# Patient Record
Sex: FEMALE | Race: WHITE | URBAN - METROPOLITAN AREA
[De-identification: names, ages, dates, MRNs, and addresses within clinical notes are randomized per-mention and may not be internally consistent; named-entity substitution may affect disease eponyms.]

---

## 2023-06-05 ENCOUNTER — OFFICE VISIT (OUTPATIENT)
Facility: LOCATION | Age: 68
End: 2023-06-05
Payer: COMMERCIAL

## 2023-06-05 DIAGNOSIS — E11.9 TYPE 2 DIABETES MELLITUS WITHOUT COMPLICATIONS: ICD-10-CM

## 2023-06-05 PROCEDURE — 99204 OFFICE O/P NEW MOD 45 MIN: CPT | Performed by: OPHTHALMOLOGY

## 2023-06-05 ASSESSMENT — INTRAOCULAR PRESSURE
OS: 25
OD: 22

## 2023-06-05 ASSESSMENT — VISUAL ACUITY
OD: 20/80
OS: 20/50

## 2023-06-05 ASSESSMENT — KERATOMETRY
OD: 43.75
OS: 44.44

## 2023-06-05 NOTE — IMPRESSION/PLAN
Impression: Type 2 diabetes mellitus without complications: Q91.9. Plan: Advised patient to continue care with PCP . Emphasized the importance of keeping BSL and A1C level low.

## 2023-06-05 NOTE — IMPRESSION/PLAN
Impression: Anatomical narrow angle, bilateral: H40.033. Plan: Referred patient to Dr. Drew Pate for further evaluation .

## 2023-06-05 NOTE — IMPRESSION/PLAN
Impression: Age-related nuclear cataract, bilateral: H25.13. Examination today revealed 2+AC , 2+NS OU. Plan: Referred patient to Dr. Twan Altamirano for further evaluation .

## 2023-06-09 ENCOUNTER — OFFICE VISIT (OUTPATIENT)
Facility: LOCATION | Age: 68
End: 2023-06-09
Payer: COMMERCIAL

## 2023-06-09 DIAGNOSIS — H40.033 ANATOMICAL NARROW ANGLE, BILATERAL: ICD-10-CM

## 2023-06-09 DIAGNOSIS — H40.2234 CHRONIC ANGLE CLOSURE, BILATERAL, INDETERMINATE STAGE: Primary | ICD-10-CM

## 2023-06-09 DIAGNOSIS — H25.13 AGE-RELATED NUCLEAR CATARACT, BILATERAL: ICD-10-CM

## 2023-06-09 PROCEDURE — 92133 CPTRZD OPH DX IMG PST SGM ON: CPT | Performed by: STUDENT IN AN ORGANIZED HEALTH CARE EDUCATION/TRAINING PROGRAM

## 2023-06-09 PROCEDURE — 92134 CPTRZ OPH DX IMG PST SGM RTA: CPT | Performed by: STUDENT IN AN ORGANIZED HEALTH CARE EDUCATION/TRAINING PROGRAM

## 2023-06-09 PROCEDURE — 92025 CPTRIZED CORNEAL TOPOGRAPHY: CPT | Performed by: STUDENT IN AN ORGANIZED HEALTH CARE EDUCATION/TRAINING PROGRAM

## 2023-06-09 PROCEDURE — 99214 OFFICE O/P EST MOD 30 MIN: CPT | Performed by: STUDENT IN AN ORGANIZED HEALTH CARE EDUCATION/TRAINING PROGRAM

## 2023-06-09 PROCEDURE — 92020 GONIOSCOPY: CPT | Performed by: STUDENT IN AN ORGANIZED HEALTH CARE EDUCATION/TRAINING PROGRAM

## 2023-06-09 RX ORDER — DORZOLAMIDE HYDROCHLORIDE AND TIMOLOL MALEATE 20; 5 MG/ML; MG/ML
SOLUTION/ DROPS OPHTHALMIC
Qty: 5 | Refills: 11 | Status: ACTIVE
Start: 2023-06-09

## 2023-06-09 ASSESSMENT — INTRAOCULAR PRESSURE
OS: 24
OD: 24
OS: 20
OD: 20

## 2023-06-09 ASSESSMENT — VISUAL ACUITY
OD: 20/80
OS: 20/50

## 2023-06-09 NOTE — IMPRESSION/PLAN
Impression: Chronic angle closure, bilateral, indeterminate stage: J08.7144. CC: Patient presents today for cataract evaluation and glaucoma evaluation. Referred by Dr. Kenna Meier Patient states she started noticing her vision getting worse a few years ago. Chronic angle closure indeterminate stage - difficult to view nerve w/o dilation POHx: FOHx: PMHx:
SocialHx:
Eye medications: Allergies: NKDA Tmax: unknown / pending Target IOP: pending Plan: Testing:
OCT/ONH 06/2023: Thinning OU, baseline OU Gonio 06/2023: Narrow PTM inf OU Today: 	 IOP unacceptable OU Educated patient on the diagnosis and physiological development of glaucoma and angle closure in great detail. Informed patient high eye pressure can cause damage to the optic nerves therefore leading to irreversible vision loss. Educated the patient that glaucoma is a chronic disease and there is no cure and damage done is irreversible but it can be treated and controlled. Discussed risks of acute angle closure glaucoma if we dilate. Informed patient based off today exam we suspect some damage and given the narrow angle we recommend a laser treatment to open a hole to get the pressure down. Advised patient sometimes if eye pressure does not get low enough we would recommend cataract surgery but before proceeding with that we need to ensure IOP goes down to safely dilate her eyes. Informed patient we are unable to determine how much vision would improve after treatment but for now we focus on preventing more vision loss. Will begin treatment and plan for LPI. Patient expressed understanding and would like to proceed w/ LPI OD then OS Plan:
Start Cosopt BID OU

## 2023-06-09 NOTE — IMPRESSION/PLAN
Impression: Type 2 diabetes mellitus without complications: T13.7. BSL: 91 (this morning) A1C: 9.3 (about 4 weeks ago) Plan: No dilated exam today. plan for dilation once angle closure treated. Patient advised to continue care with primary care physician. Discussed with patient the importance of controlling Blood Sugar, A1c levels and weight loss. Patient advised of condition and to monitor signs and symptoms of changes. Discussed ocular and systemic benefits of blood sugar control. Patient is to continue care with PCP.

## 2023-06-09 NOTE — IMPRESSION/PLAN
Impression: Age-related nuclear cataract, bilateral: H25.13.
2+AC , 2+NS OU Patient is interested in [address]: Informed patient is difficult to determine how significant her cataracts are and whether or not it is effecting her vision. Will hold off on dilation and cataract evaluation due to Chronic angle closure OU.